# Patient Record
Sex: FEMALE | Race: WHITE | NOT HISPANIC OR LATINO | Employment: OTHER | URBAN - METROPOLITAN AREA
[De-identification: names, ages, dates, MRNs, and addresses within clinical notes are randomized per-mention and may not be internally consistent; named-entity substitution may affect disease eponyms.]

---

## 2023-10-05 ENCOUNTER — APPOINTMENT (OUTPATIENT)
Dept: OPHTHALMOLOGY | Facility: CLINIC | Age: 73
End: 2023-10-05
Payer: MEDICARE

## 2023-10-24 PROBLEM — H52.03 HYPERMETROPIA OF BOTH EYES: Status: ACTIVE | Noted: 2023-10-24

## 2023-10-24 PROBLEM — I10 BENIGN ESSENTIAL HTN: Status: ACTIVE | Noted: 2023-10-24

## 2023-10-24 PROBLEM — H52.203 HYPEROPIA OF BOTH EYES WITH ASTIGMATISM AND PRESBYOPIA: Status: ACTIVE | Noted: 2023-10-24

## 2023-10-24 PROBLEM — H52.203 ASTIGMATISM OF BOTH EYES: Status: ACTIVE | Noted: 2023-10-24

## 2023-10-24 PROBLEM — M25.50 ARTHRALGIA OF MULTIPLE JOINTS: Status: ACTIVE | Noted: 2023-10-24

## 2023-10-24 PROBLEM — J34.2 DEVIATED NASAL SEPTUM: Status: ACTIVE | Noted: 2023-10-24

## 2023-10-24 PROBLEM — H18.832 RECURRENT EROSION OF LEFT CORNEA: Status: ACTIVE | Noted: 2023-10-24

## 2023-10-24 PROBLEM — H52.03 HYPEROPIA OF BOTH EYES: Status: ACTIVE | Noted: 2023-10-24

## 2023-10-24 PROBLEM — H18.529 CORNEAL EPITHELIAL BASEMENT MEMBRANE DYSTROPHY: Status: ACTIVE | Noted: 2023-10-24

## 2023-10-24 PROBLEM — H52.4 HYPEROPIA OF BOTH EYES WITH ASTIGMATISM AND PRESBYOPIA: Status: ACTIVE | Noted: 2023-10-24

## 2023-10-24 PROBLEM — L57.0 ACTINIC KERATOSIS: Status: ACTIVE | Noted: 2023-10-24

## 2023-10-24 RX ORDER — CYANOCOBALAMIN (VITAMIN B-12) 500 MCG
TABLET ORAL
COMMUNITY
End: 2023-11-22 | Stop reason: WASHOUT

## 2023-10-24 RX ORDER — FLUTICASONE PROPIONATE 50 MCG
2 SPRAY, SUSPENSION (ML) NASAL DAILY
COMMUNITY
Start: 2017-03-15

## 2023-10-24 RX ORDER — ATENOLOL 50 MG/1
0.5 TABLET ORAL DAILY
COMMUNITY
Start: 2017-11-08

## 2023-10-24 RX ORDER — NABUMETONE 500 MG/1
1 TABLET, FILM COATED ORAL EVERY 12 HOURS PRN
COMMUNITY
Start: 2018-03-29

## 2023-10-25 ENCOUNTER — APPOINTMENT (OUTPATIENT)
Dept: OPHTHALMOLOGY | Facility: CLINIC | Age: 73
End: 2023-10-25
Payer: MEDICARE

## 2023-11-22 ENCOUNTER — OFFICE VISIT (OUTPATIENT)
Dept: OPHTHALMOLOGY | Facility: CLINIC | Age: 73
End: 2023-11-22
Payer: MEDICARE

## 2023-11-22 DIAGNOSIS — H25.13 NUCLEAR SCLEROSIS OF BOTH EYES: ICD-10-CM

## 2023-11-22 DIAGNOSIS — H52.4 HYPEROPIA OF BOTH EYES WITH ASTIGMATISM AND PRESBYOPIA: ICD-10-CM

## 2023-11-22 DIAGNOSIS — H52.03 HYPEROPIA OF BOTH EYES WITH ASTIGMATISM AND PRESBYOPIA: ICD-10-CM

## 2023-11-22 DIAGNOSIS — H52.203 HYPEROPIA OF BOTH EYES WITH ASTIGMATISM AND PRESBYOPIA: ICD-10-CM

## 2023-11-22 DIAGNOSIS — H18.523 CORNEAL EPITHELIAL BASEMENT MEMBRANE DYSTROPHY OF BOTH EYES: Primary | ICD-10-CM

## 2023-11-22 PROCEDURE — 92015 DETERMINE REFRACTIVE STATE: CPT | Performed by: OPTOMETRIST

## 2023-11-22 PROCEDURE — 92014 COMPRE OPH EXAM EST PT 1/>: CPT | Performed by: OPTOMETRIST

## 2023-11-22 ASSESSMENT — REFRACTION_MANIFEST
OD_SPHERE: +0.75
OD_AXIS: 080
OD_CYLINDER: -2.00
OS_ADD: +2.25
OS_SPHERE: +1.00
OD_ADD: +2.25
OS_AXIS: 070
OS_CYLINDER: -1.00

## 2023-11-22 ASSESSMENT — VISUAL ACUITY
OS_BAT_MED: 20/200
OD_SC: 20/25
METHOD: SNELLEN - LINEAR
OD_BAT_MED: 20/80
OS_SC+: -1
OS_SC: 20/20

## 2023-11-22 ASSESSMENT — CONF VISUAL FIELD
OS_INFERIOR_NASAL_RESTRICTION: 0
OD_INFERIOR_TEMPORAL_RESTRICTION: 0
OS_SUPERIOR_NASAL_RESTRICTION: 0
OD_SUPERIOR_NASAL_RESTRICTION: 0
OD_NORMAL: 1
OS_NORMAL: 1
OD_INFERIOR_NASAL_RESTRICTION: 0
OS_SUPERIOR_TEMPORAL_RESTRICTION: 0
OD_SUPERIOR_TEMPORAL_RESTRICTION: 0
OS_INFERIOR_TEMPORAL_RESTRICTION: 0

## 2023-11-22 ASSESSMENT — CUP TO DISC RATIO
OS_RATIO: 0.2
OD_RATIO: 0.2

## 2023-11-22 ASSESSMENT — TONOMETRY
OS_IOP_MMHG: 17
OD_IOP_MMHG: 17
IOP_METHOD: GOLDMANN APPLANATION

## 2023-11-22 ASSESSMENT — ENCOUNTER SYMPTOMS
PSYCHIATRIC NEGATIVE: 0
NEUROLOGICAL NEGATIVE: 0
MUSCULOSKELETAL NEGATIVE: 0
EYES NEGATIVE: 0
GASTROINTESTINAL NEGATIVE: 0
RESPIRATORY NEGATIVE: 0
ENDOCRINE NEGATIVE: 0
HEMATOLOGIC/LYMPHATIC NEGATIVE: 0
CARDIOVASCULAR NEGATIVE: 0
CONSTITUTIONAL NEGATIVE: 0
ALLERGIC/IMMUNOLOGIC NEGATIVE: 0

## 2023-11-22 ASSESSMENT — SLIT LAMP EXAM - LIDS
COMMENTS: NORMAL
COMMENTS: NORMAL

## 2023-11-22 ASSESSMENT — EXTERNAL EXAM - RIGHT EYE: OD_EXAM: NORMAL

## 2023-11-22 ASSESSMENT — EXTERNAL EXAM - LEFT EYE: OS_EXAM: NORMAL

## 2023-11-22 NOTE — PROGRESS NOTES
Recent MARIEL and due to rubbing. 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    The patient was asked to return to our clinic in one year or sooner if ocular or vision changes occur.

## 2024-01-19 ENCOUNTER — OFFICE VISIT (OUTPATIENT)
Dept: OPHTHALMOLOGY | Facility: CLINIC | Age: 74
End: 2024-01-19
Payer: MEDICARE

## 2024-01-19 DIAGNOSIS — H52.223 REGULAR ASTIGMATISM OF BOTH EYES: ICD-10-CM

## 2024-01-19 DIAGNOSIS — H52.03 HYPERMETROPIA OF BOTH EYES: Primary | ICD-10-CM

## 2024-01-19 ASSESSMENT — REFRACTION_MANIFEST
OD_CYLINDER: -1.25
OS_AXIS: 070
OD_ADD: +2.25
OS_ADD: +2.25
OD_AXIS: 095
OD_AXIS: 095
OD_SPHERE: +0.50
OS_CYLINDER: -0.75
OD_CYLINDER: -1.75
OS_ADD: +2.25
OS_AXIS: 070
OD_SPHERE: +0.75
OD_ADD: +2.25
OS_CYLINDER: -0.75
OS_SPHERE: +1.00
OS_SPHERE: +1.00

## 2024-01-19 ASSESSMENT — SLIT LAMP EXAM - LIDS
COMMENTS: NORMAL
COMMENTS: NORMAL

## 2024-01-19 ASSESSMENT — VISUAL ACUITY
OS_CC: 20/20
OS_CC+: -3
CORRECTION_TYPE: GLASSES
OD_CC: 20/40
OD_CC+: +1
METHOD: SNELLEN - LINEAR

## 2024-01-19 ASSESSMENT — REFRACTION_WEARINGRX
OD_SPHERE: +0.75
SPECS_TYPE: SVL
OS_CYLINDER: -1.00
OD_CYLINDER: -2.00
OS_AXIS: 070
OD_AXIS: 080
OS_SPHERE: +1.00

## 2024-01-19 ASSESSMENT — EXTERNAL EXAM - RIGHT EYE: OD_EXAM: NORMAL

## 2024-01-19 ASSESSMENT — EXTERNAL EXAM - LEFT EYE: OS_EXAM: NORMAL

## 2024-01-19 NOTE — PROGRESS NOTES
Spec RX recheck today. Patient prefers a lower amount of cylinder (cyl) OD. Was having distortion and blur with new specs. Changes accepted on trial frame today.   No charge.    RTC for annual exam with DFE and MRX